# Patient Record
Sex: MALE | Race: WHITE | NOT HISPANIC OR LATINO | URBAN - METROPOLITAN AREA
[De-identification: names, ages, dates, MRNs, and addresses within clinical notes are randomized per-mention and may not be internally consistent; named-entity substitution may affect disease eponyms.]

---

## 2019-09-05 ENCOUNTER — APPOINTMENT (RX ONLY)
Dept: URBAN - METROPOLITAN AREA CLINIC 310 | Facility: CLINIC | Age: 49
Setting detail: DERMATOLOGY
End: 2019-09-05

## 2019-09-05 DIAGNOSIS — L20.89 OTHER ATOPIC DERMATITIS: ICD-10-CM

## 2019-09-05 DIAGNOSIS — D22 MELANOCYTIC NEVI: ICD-10-CM

## 2019-09-05 PROBLEM — E03.9 HYPOTHYROIDISM, UNSPECIFIED: Status: ACTIVE | Noted: 2019-09-05

## 2019-09-05 PROBLEM — L30.9 DERMATITIS, UNSPECIFIED: Status: ACTIVE | Noted: 2019-09-05

## 2019-09-05 PROBLEM — D22.5 MELANOCYTIC NEVI OF TRUNK: Status: ACTIVE | Noted: 2019-09-05

## 2019-09-05 PROBLEM — D23.61 OTHER BENIGN NEOPLASM OF SKIN OF RIGHT UPPER LIMB, INCLUDING SHOULDER: Status: ACTIVE | Noted: 2019-09-05

## 2019-09-05 PROCEDURE — ? TREATMENT REGIMEN

## 2019-09-05 PROCEDURE — 99201: CPT

## 2019-09-05 PROCEDURE — ? OTHER

## 2019-09-05 PROCEDURE — ? PRESCRIPTION

## 2019-09-05 RX ORDER — TRIAMCINOLONE ACETONIDE 1 MG/G
1 CREAM TOPICAL BID
Qty: 1 | Refills: 3 | Status: ERX | COMMUNITY
Start: 2019-09-05

## 2019-09-05 RX ADMIN — TRIAMCINOLONE ACETONIDE 1: 1 CREAM TOPICAL at 00:00

## 2019-09-05 ASSESSMENT — LOCATION DETAILED DESCRIPTION DERM
LOCATION DETAILED: LEFT VENTRAL PROXIMAL FOREARM
LOCATION DETAILED: LEFT PROXIMAL DORSAL FOREARM
LOCATION DETAILED: RIGHT VENTRAL DISTAL FOREARM
LOCATION DETAILED: INFERIOR THORACIC SPINE
LOCATION DETAILED: RIGHT PROXIMAL DORSAL FOREARM

## 2019-09-05 ASSESSMENT — LOCATION SIMPLE DESCRIPTION DERM
LOCATION SIMPLE: RIGHT FOREARM
LOCATION SIMPLE: LEFT FOREARM
LOCATION SIMPLE: UPPER BACK

## 2019-09-05 ASSESSMENT — LOCATION ZONE DERM
LOCATION ZONE: TRUNK
LOCATION ZONE: ARM

## 2019-09-05 NOTE — PROCEDURE: TREATMENT REGIMEN
Detail Level: Simple
Initiate Treatment: Only wash in vital areas with soap. Double rinse laundry. Moisturize frequently. TAC cream for two weeks. Controlled sun exposure to eczematous areas.

## 2019-09-05 NOTE — PROCEDURE: OTHER
Other (Free Text): Patch testing in reserve
Detail Level: Detailed
Note Text (......Xxx Chief Complaint.): This diagnosis correlates with the
Other (Free Text): LN2 no charge

## 2019-09-05 NOTE — HPI: RASH
How Severe Is Your Rash?: severe
Is This A New Presentation, Or A Follow-Up?: Rash
Additional History: Clobetasol has keep it at bay for the last year.

## 2019-11-18 ENCOUNTER — APPOINTMENT (RX ONLY)
Dept: URBAN - METROPOLITAN AREA CLINIC 310 | Facility: CLINIC | Age: 49
Setting detail: DERMATOLOGY
End: 2019-11-18

## 2019-11-18 DIAGNOSIS — L259 CONTACT DERMATITIS AND OTHER ECZEMA, UNSPECIFIED CAUSE: ICD-10-CM

## 2019-11-18 PROBLEM — L23.9 ALLERGIC CONTACT DERMATITIS, UNSPECIFIED CAUSE: Status: ACTIVE | Noted: 2019-11-18

## 2019-11-18 PROCEDURE — 95044 PATCH/APPLICATION TESTS: CPT

## 2019-11-18 PROCEDURE — ? PATCH TESTING

## 2019-11-18 NOTE — PROCEDURE: PATCH TESTING
Number Of Patches (Maximum Allowable Per Dos By Cms Is 90): 80
Post-Care Instructions: I reviewed with the patient in detail post-care instructions. Patient should not sweat, pick at, or get the patches wet for 48 hours.
Detail Level: Zone
Consent: Verbal consent obtained, risks reviewed including but not limited to rash, itching, allergic reaction, systemic rash, remote possiblity of anaphylaxis to allergen.

## 2019-11-18 NOTE — HPI: TESTING (PATCH TESTING)
Has Your Rash Been Biopsied Before?: has not been biopsied previously
Have You Had Previous Patch Testing In The Past?: [place] series
How Severe Is Your Rash At Its Worst?: moderate
Additional History: Pt currently has a flare up on arms and thighs.  \\nMostly arms affected. Now thighs too. Occasionally trunk. Present 1.5 years. Comes and goes. No history of eczema or allergies. Only poison oak has bothered him in past.

## 2019-11-20 ENCOUNTER — APPOINTMENT (RX ONLY)
Dept: URBAN - METROPOLITAN AREA CLINIC 310 | Facility: CLINIC | Age: 49
Setting detail: DERMATOLOGY
End: 2019-11-20

## 2019-11-20 DIAGNOSIS — L259 CONTACT DERMATITIS AND OTHER ECZEMA, UNSPECIFIED CAUSE: ICD-10-CM

## 2019-11-20 PROBLEM — L23.9 ALLERGIC CONTACT DERMATITIS, UNSPECIFIED CAUSE: Status: ACTIVE | Noted: 2019-11-20

## 2019-11-20 PROCEDURE — ? NORTH AMERICAN 80 PATCH TEST READING

## 2019-11-20 PROCEDURE — 99212 OFFICE O/P EST SF 10 MIN: CPT

## 2019-11-20 PROCEDURE — ? PATCH TEST REMOVAL

## 2019-11-20 PROCEDURE — ? OTHER

## 2019-11-20 NOTE — PROCEDURE: PATCH TEST REMOVAL
Patch Test Removal Text: The patch test material was removed from the back today. The first patch test reading was performed today.
Detail Level: None

## 2019-11-20 NOTE — PROCEDURE: [employer] 80 PATCH TEST READING
Allergen 14 Reaction: no reaction
Number Of Patches Read: 80
What Reading Time Point?: 48 hour
Detail Level: Zone
Show Allergen Counseling In The Note?: No
Show Negative Results In The Note?: Yes

## 2019-11-20 NOTE — PROCEDURE: OTHER
Other (Free Text): # Allergen Result\\n1 nickel sulfate hexahydrate -\\n2 balsam of peru (myroxylon pereirae resin) -\\n3 fragrance mix -\\n4 quaternium 15 -\\n5 neomycin sulfate -\\n6 budesonide -\\n7 cobalt (II) chloride hexahydrate -\\n8 y-wswv-lsswosaokud formaldehyde resin -\\n9 4-phenylenediamine base -\\n10 potassium dichromate -\\n11 carba mix -\\n12 thiuram mix  -\\n13 diazolidinyl urea (Germall® II) -\\n14 paraben mix  -\\n15 black rubber mix - PPD -\\n16 imidazolidinyl urea (Germall® 115) -\\n17 mercapto mix -\\n18 tixocortol-21-pivalate -\\n19 2-mercaptobenzothiazole -\\n20 colophony -\\n21 bisphenol A epoxy resin -\\n22 ethylenediamine dihydrochloride -\\n23 amerchol L101 -\\n24 benzocaine -\\n25 bacitracin -\\n26 DMDM hydantoin -\\n27 Cinchocaine -\\n28 Parthenolide -\\n29 2-bromo-2-nitropropane-1,3-diol (bronopol™) -\\n30 lidocaine-HCl -\\n31 gold sodium thiosulfate -\\n32 methyldibromo glutaronitrile (MDBGN) -\\n33 disperse blue mix  -\\n34 hydrocortisone-17-butyrate -\\n35 fragrance mix II -\\n36 iodopropynyl butylcarbamate -\\n37 mixed dialkyl thioureas -\\n38 2-hydroxyethyl methacrylate (HEMA) -\\n39 decyl glucoside -\\n40 methyl methacrylate -\\n41 cinnamic aldehyde -\\n42 ethyl acrylate -\\n43 tea tree oil, oxidized -\\n44 4-chloro-3,5-xylenol (PCMX) -\\n45 propolis -\\n46 2-hydroxy-4-methoxybenzophenone (oxybenzone) -\\n47 tosylamide formaldehyde resin -\\n48 sesquiterpenelactone mix -\\n49 coconut diethanolamide (cocamide RICO) -\\n50 4-chloro-3-cresol (PCMC) -\\n51 benzalkonium chloride -\\n52 benzophenone-4 -\\n53 Triclosan -\\n54 sorbic acid -\\n55 cananga odorata (jeniffer swift) -\\n56 compositae mix -\\n57 ethyleneurea, melamine formaldehyde mix -\\n58 sorbitan sesquioleate -\\n59 1,3-diphenylguanidine (DPG) -\\n60 cetylstearylalcohol -\\n61 Glutaraldehyde -\\n62 triamcinolone acetonide -\\n63 clobetasol-17-propionate -\\n64 dl alpha tocopherol -\\n65 ethyl cyanoacrylate -\\n66 phenoxyethanol -\\n67 disperse orange-3 -\\n68 Jaja -\\n69 butylhydroxytoluene (BHT) -\\n70 2-ethylhexyl-4-methoxycinnamate (octinoxate) -\\n71 benzyl alcohol -\\n72 formaldehyde -\\n73 methylchloroisothiazolinone/methylisothiazolinone -\\n74 methylisothiazolinone -\\n75 cocamidopropyl betaine -\\n76 dimethylaminopropylamine (DMAPA) -\\n77 oleamidopropyl dimethylamine -\\n78 propylene glycol -\\n79 amidoamine (stearamidopropyl dimethylamine) -\\n80 chlorhexidine digluconate - Other (Free Text): # Allergen Result\\n1 nickel sulfate hexahydrate -\\n2 balsam of peru (myroxylon pereirae resin) -\\n3 fragrance mix -\\n4 quaternium 15 -\\n5 neomycin sulfate -\\n6 budesonide -\\n7 cobalt (II) chloride hexahydrate -\\n8 e-lxlj-tshxhneywdz formaldehyde resin -\\n9 4-phenylenediamine base -\\n10 potassium dichromate -\\n11 carba mix -\\n12 thiuram mix  -\\n13 diazolidinyl urea (Germall® II) -\\n14 paraben mix  -\\n15 black rubber mix - PPD -\\n16 imidazolidinyl urea (Germall® 115) -\\n17 mercapto mix -\\n18 tixocortol-21-pivalate -\\n19 2-mercaptobenzothiazole -\\n20 colophony -\\n21 bisphenol A epoxy resin -\\n22 ethylenediamine dihydrochloride -\\n23 amerchol L101 -\\n24 benzocaine -\\n25 bacitracin -\\n26 DMDM hydantoin -\\n27 Cinchocaine -\\n28 Parthenolide -\\n29 2-bromo-2-nitropropane-1,3-diol (bronopol™) -\\n30 lidocaine-HCl -\\n31 gold sodium thiosulfate -\\n32 methyldibromo glutaronitrile (MDBGN) -\\n33 disperse blue mix  -\\n34 hydrocortisone-17-butyrate -\\n35 fragrance mix II -\\n36 iodopropynyl butylcarbamate -\\n37 mixed dialkyl thioureas -\\n38 2-hydroxyethyl methacrylate (HEMA) -\\n39 decyl glucoside -\\n40 methyl methacrylate -\\n41 cinnamic aldehyde -\\n42 ethyl acrylate -\\n43 tea tree oil, oxidized -\\n44 4-chloro-3,5-xylenol (PCMX) -\\n45 propolis -\\n46 2-hydroxy-4-methoxybenzophenone (oxybenzone) -\\n47 tosylamide formaldehyde resin -\\n48 sesquiterpenelactone mix -\\n49 coconut diethanolamide (cocamide RICO) -\\n50 4-chloro-3-cresol (PCMC) -\\n51 benzalkonium chloride -\\n52 benzophenone-4 -\\n53 Triclosan -\\n54 sorbic acid -\\n55 cananga odorata (jeniffer swift) -\\n56 compositae mix -\\n57 ethyleneurea, melamine formaldehyde mix -\\n58 sorbitan sesquioleate -\\n59 1,3-diphenylguanidine (DPG) -\\n60 cetylstearylalcohol -\\n61 Glutaraldehyde -\\n62 triamcinolone acetonide -\\n63 clobetasol-17-propionate -\\n64 dl alpha tocopherol -\\n65 ethyl cyanoacrylate -\\n66 phenoxyethanol -\\n67 disperse orange-3 -\\n68 Jaja -\\n69 butylhydroxytoluene (BHT) -\\n70 2-ethylhexyl-4-methoxycinnamate (octinoxate) -\\n71 benzyl alcohol -\\n72 formaldehyde -\\n73 methylchloroisothiazolinone/methylisothiazolinone -\\n74 methylisothiazolinone -\\n75 cocamidopropyl betaine -\\n76 dimethylaminopropylamine (DMAPA) -\\n77 oleamidopropyl dimethylamine -\\n78 propylene glycol -\\n79 amidoamine (stearamidopropyl dimethylamine) -\\n80 chlorhexidine digluconate -

## 2019-11-22 ENCOUNTER — APPOINTMENT (RX ONLY)
Dept: URBAN - METROPOLITAN AREA CLINIC 310 | Facility: CLINIC | Age: 49
Setting detail: DERMATOLOGY
End: 2019-11-22

## 2019-11-22 DIAGNOSIS — L259 CONTACT DERMATITIS AND OTHER ECZEMA, UNSPECIFIED CAUSE: ICD-10-CM

## 2019-11-22 PROBLEM — L23.9 ALLERGIC CONTACT DERMATITIS, UNSPECIFIED CAUSE: Status: ACTIVE | Noted: 2019-11-22

## 2019-11-22 PROCEDURE — ? OTHER

## 2019-11-22 PROCEDURE — 99213 OFFICE O/P EST LOW 20 MIN: CPT

## 2019-11-22 PROCEDURE — ? NORTH AMERICAN 80 PATCH TEST READING

## 2019-11-22 NOTE — PROCEDURE: OTHER
Other (Free Text): # Allergen Result\\n1 nickel sulfate hexahydrate -\\n2 balsam of peru (myroxylon pereirae resin) -\\n3 fragrance mix -\\n4 quaternium 15 -\\n5 neomycin sulfate -\\n6 budesonide -\\n7 cobalt (II) chloride hexahydrate -\\n8 k-suyg-bkwactzfqtm formaldehyde resin -\\n9 4-phenylenediamine base -\\n10 potassium dichromate -\\n11 carba mix -\\n12 thiuram mix  -\\n13 diazolidinyl urea (Germall® II) -\\n14 paraben mix  -\\n15 black rubber mix - PPD -\\n16 imidazolidinyl urea (Germall® 115) -\\n17 mercapto mix -\\n18 tixocortol-21-pivalate -\\n19 2-mercaptobenzothiazole -\\n20 colophony -\\n21 bisphenol A epoxy resin -\\n22 ethylenediamine dihydrochloride -\\n23 amerchol L101 -\\n24 benzocaine -\\n25 bacitracin -\\n26 DMDM hydantoin -\\n27 Cinchocaine HCL -\\n28 Parthenolide -\\n29 2-bromo-2-nitropropane-1,3-diol (bronopol™) -\\n30 lidocaine-HCl -\\n31 gold sodium thiosulfate -\\n32 methyldibromo glutaronitrile (MDBGN) -\\n33 disperse blue mix  -\\n34 hydrocortisone-17-butyrate -\\n35 fragrance mix II -\\n36 iodopropynyl butylcarbamate -\\n37 mixed dialkyl thioureas -\\n38 2-hydroxyethyl methacrylate (HEMA) -\\n39 decyl glucoside -\\n40 methyl methacrylate -\\n41 cinnamic aldehyde -\\n42 ethyl acrylate -\\n43 tea tree oil, oxidized -\\n44 4-chloro-3,5-xylenol (PCMX) -\\n45 propolis -\\n46 2-hydroxy-4-methoxybenzophenone (oxybenzone) -\\n47 tosylamide formaldehyde resin -\\n48 sesquiterpenelactone mix -\\n49 coconut diethanolamide (cocamide RICO) -\\n50 4-chloro-3-cresol (PCMC) -\\n51 benzalkonium chloride -\\n52 benzophenone-4 -\\n53 Triclosan -\\n54 sorbic acid -\\n55 cananga odorata (ylang ylang) -\\n56 compositae mix -\\n57 ethyleneurea, melamine formaldehyde mix -\\n58 sorbitan sesquioleate -\\n59 1,3-diphenylguanidine (DPG) -\\n60 cetylstearylalcohol -\\n61 Glutaraldehyde -\\n62 triamcinolone acetonide -\\n63 clobetasol-17-propionate -\\n64 dl alpha tocopherol -\\n65 ethyl cyanoacrylate -\\n66 phenoxyethanol -\\n67 disperse orange-3 -\\n68 Jaja -\\n69 butylhydroxytoluene (BHT) -\\n70 2-ethylhexyl-4-methoxycinnamate (octinoxate) -\\n71 benzyl alcohol -\\n72 formaldehyde -\\n73 methylchloroisothiazolinone/methylisothiazolinone -\\n74 methylisothiazolinone -\\n75 cocamidopropyl betaine +/-\\n76 dimethylaminopropylamine (DMAPA) -\\n77 oleamidopropyl dimethylamine -\\n78 propylene glycol -\\n79 amidoamine (stearamidopropyl dimethylamine) -\\n80 chlorhexidine digluconate -\\n\\nHe is only showing a questionable reaction to CAPB. This may be a true positive. Discussed. He does wash his hands often. He will avoid this and follow up. Discussed we could consider a biopsy but this may be nonspecific. Will hold at this time. Also discussed med options such as dupixent. May consider in future pending how he has responded to CAPB avoidance. He is to call with any questions. Other (Free Text): # Allergen Result\\n1 nickel sulfate hexahydrate -\\n2 balsam of peru (myroxylon pereirae resin) -\\n3 fragrance mix -\\n4 quaternium 15 -\\n5 neomycin sulfate -\\n6 budesonide -\\n7 cobalt (II) chloride hexahydrate -\\n8 i-vxgo-njauzmzidce formaldehyde resin -\\n9 4-phenylenediamine base -\\n10 potassium dichromate -\\n11 carba mix -\\n12 thiuram mix  -\\n13 diazolidinyl urea (Germall® II) -\\n14 paraben mix  -\\n15 black rubber mix - PPD -\\n16 imidazolidinyl urea (Germall® 115) -\\n17 mercapto mix -\\n18 tixocortol-21-pivalate -\\n19 2-mercaptobenzothiazole -\\n20 colophony -\\n21 bisphenol A epoxy resin -\\n22 ethylenediamine dihydrochloride -\\n23 amerchol L101 -\\n24 benzocaine -\\n25 bacitracin -\\n26 DMDM hydantoin -\\n27 Cinchocaine HCL -\\n28 Parthenolide -\\n29 2-bromo-2-nitropropane-1,3-diol (bronopol™) -\\n30 lidocaine-HCl -\\n31 gold sodium thiosulfate -\\n32 methyldibromo glutaronitrile (MDBGN) -\\n33 disperse blue mix  -\\n34 hydrocortisone-17-butyrate -\\n35 fragrance mix II -\\n36 iodopropynyl butylcarbamate -\\n37 mixed dialkyl thioureas -\\n38 2-hydroxyethyl methacrylate (HEMA) -\\n39 decyl glucoside -\\n40 methyl methacrylate -\\n41 cinnamic aldehyde -\\n42 ethyl acrylate -\\n43 tea tree oil, oxidized -\\n44 4-chloro-3,5-xylenol (PCMX) -\\n45 propolis -\\n46 2-hydroxy-4-methoxybenzophenone (oxybenzone) -\\n47 tosylamide formaldehyde resin -\\n48 sesquiterpenelactone mix -\\n49 coconut diethanolamide (cocamide RICO) -\\n50 4-chloro-3-cresol (PCMC) -\\n51 benzalkonium chloride -\\n52 benzophenone-4 -\\n53 Triclosan -\\n54 sorbic acid -\\n55 cananga odorata (ylang ylang) -\\n56 compositae mix -\\n57 ethyleneurea, melamine formaldehyde mix -\\n58 sorbitan sesquioleate -\\n59 1,3-diphenylguanidine (DPG) -\\n60 cetylstearylalcohol -\\n61 Glutaraldehyde -\\n62 triamcinolone acetonide -\\n63 clobetasol-17-propionate -\\n64 dl alpha tocopherol -\\n65 ethyl cyanoacrylate -\\n66 phenoxyethanol -\\n67 disperse orange-3 -\\n68 Jaja -\\n69 butylhydroxytoluene (BHT) -\\n70 2-ethylhexyl-4-methoxycinnamate (octinoxate) -\\n71 benzyl alcohol -\\n72 formaldehyde -\\n73 methylchloroisothiazolinone/methylisothiazolinone -\\n74 methylisothiazolinone -\\n75 cocamidopropyl betaine +/-\\n76 dimethylaminopropylamine (DMAPA) -\\n77 oleamidopropyl dimethylamine -\\n78 propylene glycol -\\n79 amidoamine (stearamidopropyl dimethylamine) -\\n80 chlorhexidine digluconate -\\n\\nHe is only showing a questionable reaction to CAPB. This may be a true positive. Discussed. He does wash his hands often. He will avoid this and follow up. Discussed we could consider a biopsy but this may be nonspecific. Will hold at this time. Also discussed med options such as dupixent. May consider in future pending how he has responded to CAPB avoidance. He is to call with any questions.

## 2019-11-22 NOTE — PROCEDURE: [employer] 80 PATCH TEST READING
Allergen 55 Reaction: no reaction
Show Negative Results In The Note?: Yes
Detail Level: Zone
What Reading Time Point?: 96 hour
Show Allergen Counseling In The Note?: No
Number Of Patches Read: 80

## 2020-10-22 ENCOUNTER — APPOINTMENT (RX ONLY)
Dept: URBAN - METROPOLITAN AREA CLINIC 310 | Facility: CLINIC | Age: 50
Setting detail: DERMATOLOGY
End: 2020-10-22

## 2020-10-22 VITALS — TEMPERATURE: 99.1 F

## 2020-10-22 DIAGNOSIS — L20.89 OTHER ATOPIC DERMATITIS: ICD-10-CM

## 2020-10-22 PROBLEM — L30.9 DERMATITIS, UNSPECIFIED: Status: ACTIVE | Noted: 2020-10-22

## 2020-10-22 PROCEDURE — ? TREATMENT REGIMEN

## 2020-10-22 PROCEDURE — ? BIOPSY BY SHAVE METHOD

## 2020-10-22 PROCEDURE — 99213 OFFICE O/P EST LOW 20 MIN: CPT | Mod: 25

## 2020-10-22 PROCEDURE — 11102 TANGNTL BX SKIN SINGLE LES: CPT

## 2020-10-22 PROCEDURE — ? PRESCRIPTION

## 2020-10-22 RX ORDER — CRISABOROLE 20 MG/G
OINTMENT TOPICAL QD
Qty: 1 | Refills: 3 | COMMUNITY
Start: 2020-10-22

## 2020-10-22 RX ORDER — TRIAMCINOLONE ACETONIDE 1 MG/G
CREAM TOPICAL ONCE DAILY
Qty: 1 | Refills: 2 | Status: ERX

## 2020-10-22 RX ADMIN — CRISABOROLE: 20 OINTMENT TOPICAL at 00:00

## 2020-10-22 ASSESSMENT — SEVERITY ASSESSMENT 2020: SEVERITY 2020: SEVERE

## 2020-10-22 ASSESSMENT — BSA ECZEMA: % BODY COVERED IN ECZEMA: 7

## 2020-10-22 ASSESSMENT — LOCATION SIMPLE DESCRIPTION DERM: LOCATION SIMPLE: RIGHT FOREARM

## 2020-10-22 ASSESSMENT — LOCATION ZONE DERM: LOCATION ZONE: ARM

## 2020-10-22 ASSESSMENT — LOCATION DETAILED DESCRIPTION DERM: LOCATION DETAILED: RIGHT PROXIMAL DORSAL FOREARM

## 2020-10-22 NOTE — PROCEDURE: TREATMENT REGIMEN
Plan: Consider NBUVB (has home light panel) or Dupixant if unable to control
Samples Given: Eurcrisa, twice daily for 10 days
Detail Level: Zone

## 2020-10-22 NOTE — HPI: RASH
What Type Of Note Output Would You Prefer (Optional)?: Bullet Format
Is The Patient Presenting As Previously Scheduled?: No, they are coming in before their scheduled appointment
How Severe Is Your Rash?: moderate
Is This A New Presentation, Or A Follow-Up?: Follow Up Rash
Additional History: Pt here for contact dermatitis on on right forearm, trunk, and legs. 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 The rash is worse on the forearms, but occurs elsewhere.  \\[facility] patch testing, has avoided cocomidopropyl betaine to no avail.